# Patient Record
Sex: MALE | Race: WHITE | NOT HISPANIC OR LATINO | URBAN - METROPOLITAN AREA
[De-identification: names, ages, dates, MRNs, and addresses within clinical notes are randomized per-mention and may not be internally consistent; named-entity substitution may affect disease eponyms.]

---

## 2019-05-03 ENCOUNTER — EMERGENCY (EMERGENCY)
Facility: HOSPITAL | Age: 49
LOS: 1 days | Discharge: HOME | End: 2019-05-03
Attending: EMERGENCY MEDICINE
Payer: COMMERCIAL

## 2019-05-03 VITALS
HEART RATE: 77 BPM | DIASTOLIC BLOOD PRESSURE: 80 MMHG | TEMPERATURE: 99 F | OXYGEN SATURATION: 98 % | RESPIRATION RATE: 18 BRPM | SYSTOLIC BLOOD PRESSURE: 131 MMHG

## 2019-05-03 VITALS
WEIGHT: 199.96 LBS | HEART RATE: 81 BPM | RESPIRATION RATE: 18 BRPM | DIASTOLIC BLOOD PRESSURE: 178 MMHG | TEMPERATURE: 98 F | HEIGHT: 69 IN | OXYGEN SATURATION: 100 % | SYSTOLIC BLOOD PRESSURE: 144 MMHG

## 2019-05-03 LAB
ALBUMIN SERPL ELPH-MCNC: 4.2 G/DL — SIGNIFICANT CHANGE UP (ref 3.3–5)
ALP SERPL-CCNC: 38 U/L — LOW (ref 40–120)
ALT FLD-CCNC: 15 U/L — SIGNIFICANT CHANGE UP (ref 10–45)
ANION GAP SERPL CALC-SCNC: 10 MMOL/L — SIGNIFICANT CHANGE UP (ref 5–17)
ANISOCYTOSIS BLD QL: SIGNIFICANT CHANGE UP
APTT BLD: 29.4 SEC — SIGNIFICANT CHANGE UP (ref 27.5–36.3)
AST SERPL-CCNC: 22 U/L — SIGNIFICANT CHANGE UP (ref 10–40)
BASOPHILS # BLD AUTO: 0 K/UL — SIGNIFICANT CHANGE UP (ref 0–0.2)
BASOPHILS NFR BLD AUTO: 1 % — SIGNIFICANT CHANGE UP (ref 0–2)
BILIRUB SERPL-MCNC: 0.2 MG/DL — SIGNIFICANT CHANGE UP (ref 0.2–1.2)
BLD GP AB SCN SERPL QL: NEGATIVE — SIGNIFICANT CHANGE UP
BUN SERPL-MCNC: 20 MG/DL — SIGNIFICANT CHANGE UP (ref 7–23)
CALCIUM SERPL-MCNC: 9.2 MG/DL — SIGNIFICANT CHANGE UP (ref 8.4–10.5)
CHLORIDE SERPL-SCNC: 106 MMOL/L — SIGNIFICANT CHANGE UP (ref 96–108)
CO2 SERPL-SCNC: 22 MMOL/L — SIGNIFICANT CHANGE UP (ref 22–31)
CREAT SERPL-MCNC: 0.92 MG/DL — SIGNIFICANT CHANGE UP (ref 0.5–1.3)
ELLIPTOCYTES BLD QL SMEAR: SIGNIFICANT CHANGE UP
EOSINOPHIL # BLD AUTO: 0.2 K/UL — SIGNIFICANT CHANGE UP (ref 0–0.5)
EOSINOPHIL NFR BLD AUTO: 3 % — SIGNIFICANT CHANGE UP (ref 0–6)
FERRITIN SERPL-MCNC: 6 NG/ML — LOW (ref 30–400)
GLUCOSE SERPL-MCNC: 92 MG/DL — SIGNIFICANT CHANGE UP (ref 70–99)
HAPTOGLOB SERPL-MCNC: 178 MG/DL — SIGNIFICANT CHANGE UP (ref 34–200)
HCT VFR BLD CALC: 19.8 % — CRITICAL LOW (ref 39–50)
HCT VFR BLD CALC: 23.9 % — LOW (ref 39–50)
HGB BLD-MCNC: 5.9 G/DL — CRITICAL LOW (ref 13–17)
HGB BLD-MCNC: 7.2 G/DL — LOW (ref 13–17)
HYPERCHROMIA BLD QL AUTO: SIGNIFICANT CHANGE UP
INR BLD: 1.07 RATIO — SIGNIFICANT CHANGE UP (ref 0.88–1.16)
IRON SATN MFR SERPL: 3 % — LOW (ref 16–55)
IRON SATN MFR SERPL: 9 UG/DL — LOW (ref 45–165)
LYMPHOCYTES # BLD AUTO: 0.9 K/UL — LOW (ref 1–3.3)
LYMPHOCYTES # BLD AUTO: 16 % — SIGNIFICANT CHANGE UP (ref 13–44)
MCHC RBC-ENTMCNC: 17.3 PG — LOW (ref 27–34)
MCHC RBC-ENTMCNC: 19.2 PG — LOW (ref 27–34)
MCHC RBC-ENTMCNC: 29.8 GM/DL — LOW (ref 32–36)
MCHC RBC-ENTMCNC: 30.4 GM/DL — LOW (ref 32–36)
MCV RBC AUTO: 58.1 FL — LOW (ref 80–100)
MCV RBC AUTO: 63.2 FL — LOW (ref 80–100)
MICROCYTES BLD QL: SIGNIFICANT CHANGE UP
MONOCYTES # BLD AUTO: 0.4 K/UL — SIGNIFICANT CHANGE UP (ref 0–0.9)
MONOCYTES NFR BLD AUTO: 13 % — SIGNIFICANT CHANGE UP (ref 2–14)
NEUTROPHILS # BLD AUTO: 2.8 K/UL — SIGNIFICANT CHANGE UP (ref 1.8–7.4)
NEUTROPHILS NFR BLD AUTO: 67 % — SIGNIFICANT CHANGE UP (ref 43–77)
OB PNL STL: NEGATIVE — SIGNIFICANT CHANGE UP
PLAT MORPH BLD: NORMAL — SIGNIFICANT CHANGE UP
PLATELET # BLD AUTO: 251 K/UL — SIGNIFICANT CHANGE UP (ref 150–400)
PLATELET # BLD AUTO: 275 K/UL — SIGNIFICANT CHANGE UP (ref 150–400)
POIKILOCYTOSIS BLD QL AUTO: SLIGHT — SIGNIFICANT CHANGE UP
POLYCHROMASIA BLD QL SMEAR: SLIGHT — SIGNIFICANT CHANGE UP
POTASSIUM SERPL-MCNC: 4.5 MMOL/L — SIGNIFICANT CHANGE UP (ref 3.5–5.3)
POTASSIUM SERPL-SCNC: 4.5 MMOL/L — SIGNIFICANT CHANGE UP (ref 3.5–5.3)
PROT SERPL-MCNC: 6.6 G/DL — SIGNIFICANT CHANGE UP (ref 6–8.3)
PROTHROM AB SERPL-ACNC: 12.2 SEC — SIGNIFICANT CHANGE UP (ref 10–12.9)
RBC # BLD: 3.4 M/UL — LOW (ref 4.2–5.8)
RBC # BLD: 3.78 M/UL — LOW (ref 4.2–5.8)
RBC # FLD: 17.8 % — HIGH (ref 10.3–14.5)
RBC # FLD: 23.3 % — HIGH (ref 10.3–14.5)
RBC BLD AUTO: ABNORMAL
RH IG SCN BLD-IMP: POSITIVE — SIGNIFICANT CHANGE UP
RH IG SCN BLD-IMP: POSITIVE — SIGNIFICANT CHANGE UP
SCHISTOCYTES BLD QL AUTO: SLIGHT — SIGNIFICANT CHANGE UP
SODIUM SERPL-SCNC: 138 MMOL/L — SIGNIFICANT CHANGE UP (ref 135–145)
TIBC SERPL-MCNC: 346 UG/DL — SIGNIFICANT CHANGE UP (ref 220–430)
UIBC SERPL-MCNC: 337 UG/DL — SIGNIFICANT CHANGE UP (ref 110–370)
WBC # BLD: 4.4 K/UL — SIGNIFICANT CHANGE UP (ref 3.8–10.5)
WBC # BLD: 6.9 K/UL — SIGNIFICANT CHANGE UP (ref 3.8–10.5)
WBC # FLD AUTO: 4.4 K/UL — SIGNIFICANT CHANGE UP (ref 3.8–10.5)
WBC # FLD AUTO: 6.9 K/UL — SIGNIFICANT CHANGE UP (ref 3.8–10.5)

## 2019-05-03 PROCEDURE — 86901 BLOOD TYPING SEROLOGIC RH(D): CPT

## 2019-05-03 PROCEDURE — 83550 IRON BINDING TEST: CPT

## 2019-05-03 PROCEDURE — 85045 AUTOMATED RETICULOCYTE COUNT: CPT

## 2019-05-03 PROCEDURE — 85610 PROTHROMBIN TIME: CPT

## 2019-05-03 PROCEDURE — P9016: CPT

## 2019-05-03 PROCEDURE — 82728 ASSAY OF FERRITIN: CPT

## 2019-05-03 PROCEDURE — 93005 ELECTROCARDIOGRAM TRACING: CPT

## 2019-05-03 PROCEDURE — 96374 THER/PROPH/DIAG INJ IV PUSH: CPT

## 2019-05-03 PROCEDURE — 85730 THROMBOPLASTIN TIME PARTIAL: CPT

## 2019-05-03 PROCEDURE — 96361 HYDRATE IV INFUSION ADD-ON: CPT

## 2019-05-03 PROCEDURE — 99285 EMERGENCY DEPT VISIT HI MDM: CPT | Mod: 25

## 2019-05-03 PROCEDURE — 99236 HOSP IP/OBS SAME DATE HI 85: CPT

## 2019-05-03 PROCEDURE — 36430 TRANSFUSION BLD/BLD COMPNT: CPT

## 2019-05-03 PROCEDURE — 83010 ASSAY OF HAPTOGLOBIN QUANT: CPT

## 2019-05-03 PROCEDURE — 93010 ELECTROCARDIOGRAM REPORT: CPT

## 2019-05-03 PROCEDURE — 82272 OCCULT BLD FECES 1-3 TESTS: CPT

## 2019-05-03 PROCEDURE — 86850 RBC ANTIBODY SCREEN: CPT

## 2019-05-03 PROCEDURE — 85027 COMPLETE CBC AUTOMATED: CPT

## 2019-05-03 PROCEDURE — 83540 ASSAY OF IRON: CPT

## 2019-05-03 PROCEDURE — G0378: CPT

## 2019-05-03 PROCEDURE — 80053 COMPREHEN METABOLIC PANEL: CPT

## 2019-05-03 PROCEDURE — 86900 BLOOD TYPING SEROLOGIC ABO: CPT

## 2019-05-03 PROCEDURE — 86923 COMPATIBILITY TEST ELECTRIC: CPT

## 2019-05-03 RX ORDER — PANTOPRAZOLE SODIUM 20 MG/1
40 TABLET, DELAYED RELEASE ORAL
Qty: 0 | Refills: 0 | Status: DISCONTINUED | OUTPATIENT
Start: 2019-05-03 | End: 2019-05-07

## 2019-05-03 RX ORDER — ESOMEPRAZOLE MAGNESIUM 40 MG/1
1 CAPSULE, DELAYED RELEASE ORAL
Qty: 0 | Refills: 0 | COMMUNITY

## 2019-05-03 RX ORDER — SODIUM CHLORIDE 9 MG/ML
1000 INJECTION INTRAMUSCULAR; INTRAVENOUS; SUBCUTANEOUS ONCE
Qty: 0 | Refills: 0 | Status: COMPLETED | OUTPATIENT
Start: 2019-05-03 | End: 2019-05-03

## 2019-05-03 RX ORDER — SERTRALINE 25 MG/1
150 TABLET, FILM COATED ORAL DAILY
Qty: 0 | Refills: 0 | Status: DISCONTINUED | OUTPATIENT
Start: 2019-05-03 | End: 2019-05-07

## 2019-05-03 RX ORDER — IRON SUCROSE 20 MG/ML
200 INJECTION, SOLUTION INTRAVENOUS ONCE
Qty: 0 | Refills: 0 | Status: COMPLETED | OUTPATIENT
Start: 2019-05-03 | End: 2019-05-03

## 2019-05-03 RX ORDER — SERTRALINE 25 MG/1
150 TABLET, FILM COATED ORAL
Qty: 0 | Refills: 0 | COMMUNITY

## 2019-05-03 RX ADMIN — PANTOPRAZOLE SODIUM 40 MILLIGRAM(S): 20 TABLET, DELAYED RELEASE ORAL at 15:52

## 2019-05-03 RX ADMIN — IRON SUCROSE 110 MILLIGRAM(S): 20 INJECTION, SOLUTION INTRAVENOUS at 16:55

## 2019-05-03 RX ADMIN — SODIUM CHLORIDE 1000 MILLILITER(S): 9 INJECTION INTRAMUSCULAR; INTRAVENOUS; SUBCUTANEOUS at 10:06

## 2019-05-03 RX ADMIN — SERTRALINE 150 MILLIGRAM(S): 25 TABLET, FILM COATED ORAL at 15:52

## 2019-05-03 RX ADMIN — SODIUM CHLORIDE 1000 MILLILITER(S): 9 INJECTION INTRAMUSCULAR; INTRAVENOUS; SUBCUTANEOUS at 11:51

## 2019-05-03 NOTE — ED ADULT NURSE NOTE - NSIMPLEMENTINTERV_GEN_ALL_ED
Implemented All Universal Safety Interventions:  [place] to call system. Call bell, personal items and telephone within reach. Instruct patient to call for assistance. Room bathroom lighting operational. Non-slip footwear when patient is off stretcher. Physically safe environment: no spills, clutter or unnecessary equipment. Stretcher in lowest position, wheels locked, appropriate side rails in place.

## 2019-05-03 NOTE — ED CDU PROVIDER INITIAL DAY NOTE - DETAILS
PRBC, determine iron deficiency vs other causes for microcytic anemia PRBC, determine iron deficiency vs other causes for microcytic anemia, frequent re-evaluations, hematology consult.

## 2019-05-03 NOTE — ED PROVIDER NOTE - GASTROINTESTINAL, MLM
Abdomen soft, non-tender, no guarding. BSx 4. Rectal exam guaiac negative no hemorrhoids or fissures noted

## 2019-05-03 NOTE — ED CDU PROVIDER INITIAL DAY NOTE - PROGRESS NOTE DETAILS
Pt comfortable. No complaints. Vital signs stable. Pt seen by hematology Dr. Jimenez earlier, iron infusion ordered.  Spoke with covering hematologist Dr. Rodriguez, states pt can follow up as an outpt for GI eval and hemoglobin electrophoresis, if appropriate H/H response post transfusion. -Nuria Galo PA-C CDU NOTE NUZHAT GARCIA: Pt resting comfortably, feeling well without complaint. denies SOB, CP, fatigue currently. tolerated transfusions well, no ADRs. NAD, VSS. will f/u post transfusion CBC. CDU NOTE NUZHAT GARCIA: repeat H&H increased to 7.2 & 23.9. case and plan discussed with Dr. Wilkerson, will contact heme. CDU NOTE NUZHAT GARCIA: repeat H&H increased to 7.2 & 23.9. pt states he did walk and did not have SOB, CP, or fatigue. case and plan discussed with Dr. Wilkerson, will [facility]. CDU NOTE NUZHAT GARCIA: spoke with Heme Dr. Sherman, stated that the prior plan by her heme colleagues for 2U PRBCs and f/u outpt is appropriate and she agrees with the plan. states she feels the Hgb increase from 5.9 to 7.2 is appropriate and pt stable for outpt f/u given symptoms resolved. case and plan discussed with Dr. Wilkerson; pt stable for discharge as per heme. Attending MD Wilkerson: 49M sent to the CDU after presenting to the ED for low blood count.  In ED, Hb 5.9, transfused 2U pRBCs, 7.2 discussed with heme.  Can follow up outpatient.  Denies chest pain, shortness of breath, palpitations. Denies abdominal pain, nausea, vomiting, diarrhea, blood in stools. Denies dysuria, hematuria.  On exam, NAD, ambulated while speaking with patient no dyspnea while ambulating and speaking, head NCAT, PERRL, FROM at neck, no tenderness to midline palpation, no stepoffs along length of spine, lungs CTAB with good inspiratory effort, +S1S2, no m/r/g, abdomen soft with +BS, NT, ND, no CVAT, moving all extremities with 5/5 strength bilateral upper and lower extremities, good and equal  strength bilaterally; A/P: 49M with anemia, to follow up with heme/onc, IV Fe as per heme/onc

## 2019-05-03 NOTE — CONSULT NOTE ADULT - SUBJECTIVE AND OBJECTIVE BOX
Patient is a 49y old  Male who presents with a chief complaint of shortness of breath and fatigue now for the last few weeks and maybe months. He does not have a history of blood per rectum and eats well. He might have a family history of thalassemia. The pt presented to his primary and cardiologist and bloods returned with a white cell count of 4.4 rbc 3.40 hemoglobin 5.9 hematocrit of 19.8 mcv  58.1 MCHC 29.8 rdw pf 17.8 and platelet count pf [id number] and differential count was not remarkable. The retic count was 1.9 %. He has been on Nexium now for 2 years.    The labs are consistent with iron deficiency and with the relatively high rdw for this hemoglobin it appears he has a hemoglobinopathy as well perhaps beta thal, He is of [place] origin. At this point he is receiving prbcs and I will order iv iron. After iron repletion and likely gi eval, he will have a hemoglobin electrophoresis.    HPI:         MEDICATIONS  (STANDING):  pantoprazole    Tablet 40 milliGRAM(s) Oral before breakfast  sertraline 150 milliGRAM(s) Oral daily    MEDICATIONS  (PRN):      T(F): 97.9 (05-03-19 @ 12:17), Max: 98 (05-03-19 @ 09:21)  HR: 78 (05-03-19 @ 12:17) (62 - 81)  BP: 104/67 (05-03-19 @ 12:17) (98/64 - 144/178)  RR: 17 (05-03-19 @ 12:17) (16 - 18)  SpO2: 100% (05-03-19 @ 12:17) (100% - 100%)    LABS:    CBC Full  -  ( 03 May 2019 10:00 )  WBC Count : 4.4 K/uL  RBC Count : 3.40 M/uL  Hemoglobin : 5.9 g/dL  Hematocrit : 19.8 %  Platelet Count - Automated : 275 K/uL  Mean Cell Volume : 58.1 fl  Mean Cell Hemoglobin : 17.3 pg  Mean Cell Hemoglobin Concentration : 29.8 gm/dL  Auto Neutrophil # : 2.8 K/uL  Auto Lymphocyte # : 0.9 K/uL  Auto Monocyte # : 0.4 K/uL  Auto Eosinophil # : 0.2 K/uL  Auto Basophil # : 0.0 K/uL  Auto Neutrophil % : 67.0 %  Auto Lymphocyte % : 16.0 %  Auto Monocyte % : 13.0 %  Auto Eosinophil % : 3.0 %  Auto Basophil % : 1.0 %    05-03    138  |  106  |  20  ----------------------------<  92  4.5   |  22  |  0.92    Ca    9.2      03 May 2019 10:00    TPro  6.6  /  Alb  4.2  /  TBili  0.2  /  DBili  x   /  AST  22  /  ALT  15  /  AlkPhos  38<L>  05-03    PT/INR - ( 03 May 2019 10:00 )   PT: 12.2 sec;   INR: 1.07 ratio         PTT - ( 03 May 2019 10:00 )  PTT:29.4 sec      ROS:  Negative except for:    PAST MEDICAL & SURGICAL HISTORY:  GERD (gastroesophageal reflux disease)  No significant past surgical history      SOCIAL HISTORY:    FAMILY HISTORY:  Family history of thalassemia (Aunt)      Allergies    Percocet 5/325 (Unknown)    Intolerances        PHYSICAL EXAM pale  General: adult in NAD  HEENT: clear oropharynx, anicteric sclera, pink conjunctivae  Neck: supple  CV: normal S1S2 with no murmur rubs or gallops  Lungs: clear to auscultation, no wheezes, no rales  Abdomen: soft non-tender non-distended, no hepatosplenomegaly  Ext: no clubbing cyanosis or edema  Skin: no rashes and no petechiae  Neuro: alert and oriented X3 no focal deficits    BLOOD SMEAR INTERPRETATION:    RADIOLOGY :

## 2019-05-03 NOTE — CONSULT NOTE ADULT - ASSESSMENT
49y old  Male who presents with a chief complaint of shortness of breath and fatigue now for the last few weeks and maybe months. He does not have a history of blood per rectum and eats well. He might have a family history of thalassemia. The pt presented to his primary and cardiologist and bloods returned with a white cell count of 4.4 rbc 3.40 hemoglobin 5.9 hematocrit of 19.8 mcv  58.1 MCHC 29.8 rdw pf 17.8 and platelet count pf [id number] and differential count was not remarkable. The retic count was 1.9 %. He has been on Nexium now for 2 years.    The labs are consistent with iron deficiency and with the relatively high rdw for this hemoglobin it appears he has a hemoglobinopathy as well perhaps beta thal, He is of [place] origin. At this point he is receiving prbcs and I will order iv iron. After iron repletion and likely gi eval, he will have a hemoglobin electrophoresis.

## 2019-05-03 NOTE — ED ADULT NURSE NOTE - CHPI ED NUR SYMPTOMS NEG
no chills/no nausea/no vomiting/no tingling/no weakness/no decreased eating/drinking/no fever/no pain

## 2019-05-03 NOTE — ED ADULT NURSE NOTE - OBJECTIVE STATEMENT
49 yrs old male with h/o depression and Gerd present to the Er for low h/h. As per pt he went to his PCP and was told that his hemoglobin is 5. Pt report that he has been experiencing generalized tiredness and dizziness for 1 month now. Pt also endorses exertional shortness of breath. Pt also state he also had mood changes and numbness in both hands. Pt communicated that symptoms were aggravated by movements . Pt denies pain. Denies N/V/D. Report normal bowel and bladder movement. Denies having colonoscopy or prostate examination

## 2019-05-03 NOTE — ED PROVIDER NOTE - ATTENDING CONTRIBUTION TO CARE
Dr. Arreguin Note: pt here with wife presenting with malaise for one month, outpt labs showing hgb 5s, no black stool, normal diet, fh of thalasemia, appears pale, non-tachycardic, abdomen soft, nt

## 2019-05-03 NOTE — ED PROVIDER NOTE - CLINICAL SUMMARY MEDICAL DECISION MAKING FREE TEXT BOX
50 yo M cigar smoker with pmhx substance abuse presenting with anemia. PE unremarkable except for palor. Will obtain labs, ekg, and reassess for possible transfusion

## 2019-05-03 NOTE — ED CDU PROVIDER DISPOSITION NOTE - NSFOLLOWUPINSTRUCTIONS_ED_ALL_ED_FT
1. Take over-the-counter Ferrous Sulfate 325mg by mouth twice/day on an empty stomach (with juice/water), 2 hrs prior to or 4hrs after antacids. Eat foods rich in iron such as spinach, lentils, beans, beef, veal, chicken, salmon, and tuna (3oz servings).   2. Drink plenty of fluids to stay hydrated.  3. You will need to follow-up with your PMD and our hematology clinic ([phone]) in 2-3 days for your anemia. A copy of your results were given to you to bring to your appt.  4. Return to ER for lightheaded/dizziness, chest pain, palpitations, shortness of breath, active bleeding, or any other concerns.

## 2019-05-03 NOTE — ED PROVIDER NOTE - OBJECTIVE STATEMENT
48 yo M cigar smoker with pmhx substance abuse presenting with "low blood count". Patient states he has been having fatigue, pale skin and lightheadedness for about a month. Patient admits to sob with exertion with the start of his symptoms. Patient went to his pcp yesterday and had bloodwork done. Patient also received a B12 shot which helped with his symptoms slightly. Patient received a call from his doctor this morning that his HGB was "5". Patient denies cp, cough, fever, ha, neck pain, back pain, abd pain, nvd, melena, brbpr, hematuria, dysuria, and flank pain. Denies history of anemia for himself or transfusions.     fhx- thalassemia in aunt  shx- exETOH abuser and substance abuser. cigar smoker  meds- zoloft

## 2019-05-03 NOTE — ED CDU PROVIDER DISPOSITION NOTE - ATTENDING CONTRIBUTION TO CARE
Attending MD Wilkerson:   I personally have seen and examined this patient.  Physician assistant note reviewed and agree on plan of care and except where noted.  See below for details.     Seen in CDU2, unaccompanied    Attending MD Wilkerson: 49M sent to the CDU after presenting to the ED for low blood count.  In ED, Hb 5.9, transfused 2U pRBCs, 7.2 discussed with heme.  Can follow up outpatient.  Denies chest pain, shortness of breath, palpitations. Denies abdominal pain, nausea, vomiting, diarrhea, blood in stools. Denies dysuria, hematuria.  On exam, NAD, ambulated while speaking with patient no dyspnea while ambulating and speaking, head NCAT, PERRL, FROM at neck, no tenderness to midline palpation, no stepoffs along length of spine, lungs CTAB with good inspiratory effort, +S1S2, no m/r/g, abdomen soft with +BS, NT, ND, no CVAT, moving all extremities with 5/5 strength bilateral upper and lower extremities, good and equal  strength bilaterally; A/P: 49M with anemia, to follow up with heme/onc, IV Fe as per heme/onc

## 2019-05-03 NOTE — ED PROVIDER NOTE - NOTES
Spoke with attending. Agrees with plan. Wants [facility]ematology to see patient [phone]

## 2019-05-03 NOTE — ED CDU PROVIDER DISPOSITION NOTE - CLINICAL COURSE
50 yo M cigar smoker with pmhx substance abuse presenting with "low blood count". Patient states he has been having fatigue, pale skin and lightheadedness for about a month. Patient admits to sob with exertion with the start of his symptoms. Patient went to his pcp yesterday and had bloodwork done. Patient also received a B12 shot which helped with his symptoms slightly. Patient received a call from his doctor this morning that his HGB was "5". Patient denies cp, cough, fever, ha, neck pain, back pain, abd pain, nvd, melena, brbpr, hematuria, dysuria, and flank pain. Denies history of anemia for himself or transfusions.   In ED, H&H 5.9 & 19.8. heme c/s'd, recommended 2U PRBCs and outpt f/u. pt sent to CDU for transfusions.   In CDU, H&H increased to 7.2 & 23.9, symptoms resolved. spoke with heme agree with initial plan for outpt f/u. pt d/c'd and to f/u with heme outpt.

## 2019-05-04 NOTE — ED ADULT NURSE REASSESSMENT NOTE - NS ED NURSE REASSESS COMMENT FT1
Consent in chart. Risks and benefits explained to patient. Patient verbalized understanding of risks and benefits. Patient aware of possible side effects. Vital signs stable. Second RN at bedside for confirmation.
Pt discharged as per provider. Pt verbalizes understanding to discharge orders & will follow up with PMD post discharge. IV lock removed. No bleeding noted. Pt stable upon discharge.
Pt received from [facility]. Pt oriented to CDU & plan of care was discussed. Pt denies any weakness, dizziness/ lightheadedness, SOB or fatigue at the moment. Pt states he is feeling much better. Safety & comfort measures maintained. Call bell in reach. Will continue to monitor.

## 2021-09-07 ENCOUNTER — EMERGENCY (EMERGENCY)
Facility: HOSPITAL | Age: 51
LOS: 1 days | Discharge: HOME | End: 2021-09-07
Attending: EMERGENCY MEDICINE
Payer: COMMERCIAL

## 2021-09-07 VITALS
HEIGHT: 69 IN | DIASTOLIC BLOOD PRESSURE: 73 MMHG | TEMPERATURE: 100 F | RESPIRATION RATE: 18 BRPM | HEART RATE: 97 BPM | SYSTOLIC BLOOD PRESSURE: 114 MMHG | WEIGHT: 190.04 LBS | OXYGEN SATURATION: 98 %

## 2021-09-07 PROCEDURE — 99283 EMERGENCY DEPT VISIT LOW MDM: CPT

## 2021-09-07 PROCEDURE — 73080 X-RAY EXAM OF ELBOW: CPT

## 2021-09-07 PROCEDURE — 99283 EMERGENCY DEPT VISIT LOW MDM: CPT | Mod: 25

## 2021-09-07 PROCEDURE — 73080 X-RAY EXAM OF ELBOW: CPT | Mod: 26,LT

## 2021-09-07 RX ORDER — IBUPROFEN 200 MG
600 TABLET ORAL ONCE
Refills: 0 | Status: COMPLETED | OUTPATIENT
Start: 2021-09-07 | End: 2021-09-07

## 2021-09-07 RX ORDER — COLCHICINE 0.6 MG
1.2 TABLET ORAL ONCE
Refills: 0 | Status: COMPLETED | OUTPATIENT
Start: 2021-09-07 | End: 2021-09-07

## 2021-09-07 RX ADMIN — Medication 600 MILLIGRAM(S): at 14:33

## 2021-09-07 RX ADMIN — Medication 1.2 MILLIGRAM(S): at 14:40

## 2021-09-07 NOTE — ED PROVIDER NOTE - PLAN OF CARE
L elbow pain / olecranon bursitis s/p blunt trauma 1 week ago, low suspicion for septic joint  -XR  -Motrin likely olecranon bursitis

## 2021-09-07 NOTE — ED ADULT NURSE NOTE - NSFALLRSKASSESSDT_ED_ALL_ED
Called and left messageX2.  No contact.     Reason for Disposition  • Second attempt to contact family AND no contact made.  Answering service notified.    Protocols used: NO CONTACT OR DUPLICATE CONTACT CALL-P-AH       07-Sep-2021 14:28

## 2021-09-07 NOTE — ED PROVIDER NOTE - CLINICAL SUMMARY MEDICAL DECISION MAKING FREE TEXT BOX
Dr. Arreguin Note: s/s c/w likely olecranon bursitis L elbow, no signs of septic joint as pt able to ROM elbow without pain and has more pain and inflammation of olecranon bursa area.

## 2021-09-07 NOTE — ED PROVIDER NOTE - OBJECTIVE STATEMENT
52yo M pmh gout, prior alcohol dependence now sober presents for evaluation of L elbow pain/swelling x 1 week s/p striking elbow at work (). Initially improved w allopurinol at home, but then became worse over past 2 days with increased swelling to elbow and proximal forearm. No fever/chills. No ROM change. Typically gets gout in the toe, and usually in the setting of alcohol use. Elbow pain inconsistent with degree of gouty type pain from the past. No open wounds.

## 2021-09-07 NOTE — ED PROVIDER NOTE - PROGRESS NOTE DETAILS
Motrin and single dose colchicine provided (although low suspicion of gout). XR reviewed by Dr Arreguin without acute emergent findings. Plan to discharged w prednisone 50mg x 4 days in place of the Medrol dose pack he just started. Plan for discharge discussed with patient. All questions answered. Pt verbalizes agreement and understanding of plan. -Joanna Pena PA-C

## 2021-09-07 NOTE — ED PROVIDER NOTE - ATTENDING CONTRIBUTION TO CARE
Pt with LEFT elbow pain and reddness for one week, tender overlying olecranon bursa, FROM of elbow, no elbow effusion palpable.

## 2021-09-07 NOTE — ED PROVIDER NOTE - NSFOLLOWUPCLINICS_GEN_ALL_ED_FT
[facility]  Orthopedic Surgery  [address], 3rd & 4th [place] [address]  Phone: [phone]  Fax:

## 2021-09-07 NOTE — ED PROVIDER NOTE - PATIENT PORTAL LINK FT
You can access the FollowMyHealth Patient Portal offered by [facility] by registering at the following website: [email or web address]. By joining [facility]’s FollowMyHealth portal, you will also be able to view your health information using other applications (apps) compatible with our system.

## 2021-09-07 NOTE — ED ADULT NURSE NOTE - OBJECTIVE STATEMENT
52 yo M w/ PMHx of GERD, gout presents to ED via waiting room c/o L elbow pain. Pt reports he hit his elbow at work 1 week ago, was initially improving before worsening again. Unimproved with allopurinol. No change in ROM. Mild swelling to L elbow and forearm noted. Pt denies any CP, SOB, cough, N/V, fever, chills, urinary complaints, constipation, diarrhea, HA, dizziness, weakness. Pt A&Ox4, lungs CTA, +central pulses. Abdomen soft, not tender, not distended. Ambulating w/ steady gait, safety and comfort maintained, no acute distress noted at this time. Pt denies any recent travel or known sick contacts.

## 2021-09-07 NOTE — ED PROVIDER NOTE - MUSCULOSKELETAL, MLM
Spine appears normal, range of motion is not limited, no muscle tenderness. L elbow w olecranon bursitis. Mild overlying erythema and swelling that extends on extensor surface over brachioradialis region. Tenderness limited to medial aspect of olecranon in joint space. FROM. No warmth. Skin intact. Radial pulse 2+. Sensation intact.

## 2021-09-07 NOTE — ED PROVIDER NOTE - NSFOLLOWUPINSTRUCTIONS_ED_ALL_ED_FT
Rest. Hydrate. Elevate affected site. Ace wrap for compression of elbow.  Follow up with Orthopedist for further evaluation. Call for appointment.  For pain Take motrin 600mg by mouth every 8 hours as needed for pain. Take with food. (Available over the counter - take three 200mg tabs).  Discontinue Medrol dose pack, and instead start Prednisone 50mg for 5 days. Next dose should be tomorrow morning.  Return to ER for new or worsening symptoms including severe pain, numbness, tingling, weakness, swelling, or any concern.

## 2021-09-07 NOTE — ED PROVIDER NOTE - CARE PLAN
1 Principal Discharge DX:	Left elbow pain  Assessment and plan of treatment:	L elbow pain / olecranon bursitis s/p blunt trauma 1 week ago, low suspicion for septic joint  -XR  -Motrin   Principal Discharge DX:	Left elbow pain  Goal:	likely olecranon bursitis  Assessment and plan of treatment:	L elbow pain / olecranon bursitis s/p blunt trauma 1 week ago, low suspicion for septic joint  -XR  -Motrin

## 2021-09-07 NOTE — ED ADULT NURSE NOTE - NSICDXFAMILYHX_GEN_ALL_CORE_FT
FAMILY HISTORY:  Aunt  Still living? Unknown  Family history of thalassemia, Age at diagnosis: Age Unknown

## 2022-06-20 NOTE — ED PROVIDER NOTE - TOBACCO/CIGAR DURATION (MO/YR)
Date 6/20/22   Exercise    Supine wand shoulder flex Hold 5-10 s X 3-5 reps   Wall walk flex for shld Hold 30 seconds X 1-3 reps   Standing wand shld IR/EXT Hold 30 seconds X 1-3 reps                                          when golfing

## 2023-12-12 PROBLEM — M10.9 GOUT, UNSPECIFIED: Chronic | Status: ACTIVE | Noted: 2021-09-07

## 2023-12-12 PROBLEM — K21.9 GASTRO-ESOPHAGEAL REFLUX DISEASE WITHOUT ESOPHAGITIS: Chronic | Status: ACTIVE | Noted: 2019-05-03

## 2024-01-19 ENCOUNTER — OUTPATIENT (OUTPATIENT)
Dept: OUTPATIENT SERVICES | Facility: HOSPITAL | Age: 54
LOS: 1 days | Discharge: HOME | End: 2024-01-19

## 2024-01-19 DIAGNOSIS — D64.9 ANEMIA, UNSPECIFIED: ICD-10-CM

## 2024-01-22 ENCOUNTER — APPOINTMENT (OUTPATIENT)
Dept: HEMATOLOGY ONCOLOGY | Facility: CLINIC | Age: 54
End: 2024-01-22

## 2024-02-13 ENCOUNTER — RESULT REVIEW (OUTPATIENT)
Age: 54
End: 2024-02-13

## 2024-02-13 ENCOUNTER — APPOINTMENT (OUTPATIENT)
Dept: HEMATOLOGY ONCOLOGY | Facility: CLINIC | Age: 54
End: 2024-02-13
Payer: COMMERCIAL

## 2024-02-13 ENCOUNTER — NON-APPOINTMENT (OUTPATIENT)
Age: 54
End: 2024-02-13

## 2024-02-13 VITALS
BODY MASS INDEX: 30.07 KG/M2 | HEIGHT: 68.11 IN | RESPIRATION RATE: 16 BRPM | SYSTOLIC BLOOD PRESSURE: 147 MMHG | HEART RATE: 94 BPM | TEMPERATURE: 97.2 F | WEIGHT: 198.41 LBS | OXYGEN SATURATION: 97 % | DIASTOLIC BLOOD PRESSURE: 84 MMHG

## 2024-02-13 DIAGNOSIS — D50.8 OTHER IRON DEFICIENCY ANEMIAS: ICD-10-CM

## 2024-02-13 DIAGNOSIS — F17.290 NICOTINE DEPENDENCE, OTHER TOBACCO PRODUCT, UNCOMPLICATED: ICD-10-CM

## 2024-02-13 LAB
ALBUMIN SERPL ELPH-MCNC: 4.5 G/DL
ALP BLD-CCNC: 67 U/L
ALT SERPL-CCNC: 15 U/L
ANION GAP SERPL CALC-SCNC: 14 MMOL/L
AST SERPL-CCNC: 16 U/L
BASOPHILS # BLD AUTO: 0.06 K/UL — SIGNIFICANT CHANGE UP (ref 0–0.2)
BASOPHILS NFR BLD AUTO: 1.1 % — SIGNIFICANT CHANGE UP (ref 0–2)
BILIRUB SERPL-MCNC: 0.4 MG/DL
BUN SERPL-MCNC: 16 MG/DL
CALCIUM SERPL-MCNC: 9.7 MG/DL
CHLORIDE SERPL-SCNC: 104 MMOL/L
CO2 SERPL-SCNC: 23 MMOL/L
CREAT SERPL-MCNC: 0.97 MG/DL
EGFR: 93 ML/MIN/1.73M2
EOSINOPHIL # BLD AUTO: 0.17 K/UL — SIGNIFICANT CHANGE UP (ref 0–0.5)
EOSINOPHIL NFR BLD AUTO: 3.2 % — SIGNIFICANT CHANGE UP (ref 0–6)
FERRITIN SERPL-MCNC: 16 NG/ML
GLUCOSE SERPL-MCNC: 132 MG/DL
HCT VFR BLD CALC: 43.6 % — SIGNIFICANT CHANGE UP (ref 39–50)
HGB BLD-MCNC: 14.1 G/DL — SIGNIFICANT CHANGE UP (ref 13–17)
IMM GRANULOCYTES NFR BLD AUTO: 0 % — SIGNIFICANT CHANGE UP (ref 0–0.9)
IRON SATN MFR SERPL: 14 %
IRON SERPL-MCNC: 53 UG/DL
LYMPHOCYTES # BLD AUTO: 1.18 K/UL — SIGNIFICANT CHANGE UP (ref 1–3.3)
LYMPHOCYTES # BLD AUTO: 22.1 % — SIGNIFICANT CHANGE UP (ref 13–44)
MCHC RBC-ENTMCNC: 26.7 PG — LOW (ref 27–34)
MCHC RBC-ENTMCNC: 32.3 G/DL — SIGNIFICANT CHANGE UP (ref 32–36)
MCV RBC AUTO: 82.6 FL — SIGNIFICANT CHANGE UP (ref 80–100)
MONOCYTES # BLD AUTO: 0.41 K/UL — SIGNIFICANT CHANGE UP (ref 0–0.9)
MONOCYTES NFR BLD AUTO: 7.7 % — SIGNIFICANT CHANGE UP (ref 2–14)
NEUTROPHILS # BLD AUTO: 3.51 K/UL — SIGNIFICANT CHANGE UP (ref 1.8–7.4)
NEUTROPHILS NFR BLD AUTO: 65.9 % — SIGNIFICANT CHANGE UP (ref 43–77)
NRBC # BLD: 0 /100 WBCS — SIGNIFICANT CHANGE UP (ref 0–0)
PLATELET # BLD AUTO: 270 K/UL — SIGNIFICANT CHANGE UP (ref 150–400)
POTASSIUM SERPL-SCNC: 4.1 MMOL/L
PROT SERPL-MCNC: 7.2 G/DL
RBC # BLD: 5.28 M/UL — SIGNIFICANT CHANGE UP (ref 4.2–5.8)
RBC # FLD: 14.5 % — SIGNIFICANT CHANGE UP (ref 10.3–14.5)
SODIUM SERPL-SCNC: 140 MMOL/L
TIBC SERPL-MCNC: 376 UG/DL
UIBC SERPL-MCNC: 323 UG/DL
WBC # BLD: 5.33 K/UL — SIGNIFICANT CHANGE UP (ref 3.8–10.5)
WBC # FLD AUTO: 5.33 K/UL — SIGNIFICANT CHANGE UP (ref 3.8–10.5)

## 2024-02-13 PROCEDURE — 99406 BEHAV CHNG SMOKING 3-10 MIN: CPT

## 2024-02-13 PROCEDURE — 99215 OFFICE O/P EST HI 40 MIN: CPT

## 2024-02-13 PROCEDURE — G2211 COMPLEX E/M VISIT ADD ON: CPT | Mod: NC,1L

## 2024-02-15 PROBLEM — F17.290 CIGAR SMOKER: Status: ACTIVE | Noted: 2024-02-15

## 2024-02-15 PROBLEM — D50.8 OTHER IRON DEFICIENCY ANEMIA: Status: ACTIVE | Noted: 2024-01-22

## 2024-02-15 RX ORDER — CHLORHEXIDINE GLUCONATE 4 %
325 (65 FE) LIQUID (ML) TOPICAL
Refills: 0 | Status: ACTIVE | COMMUNITY

## 2024-02-15 NOTE — HISTORY OF PRESENT ILLNESS
[de-identified] : 53M with PMH of iron deficiency anemia previously evaluated in 2019 at [facility] presenting in 2/2024 to  [facility] to establish care and follow up monitoring of iron deficiency anemia. Had noted increased episodes of palpitations but reports that he drinks tremendous amount of coffee and knows he needs to cut down.  Hematologic history: -5/2019: experienced progressive fatigue, dyspnea on exertion, found to have microcytic anemia with hgb=5 g/dL - Initially started PO iron TID with good response - 6/5/19: EGD/Colonoscopy with no abnormalities - Tested negative for hemoglobinopathy, blood indices normalized with oral iron replacement - not seen between 10/2020 - 3/2022 then was seen again at [facility] and had normal blood indices with Hg >14 g/dL   [de-identified] : 2/13/24:  Today he reports he feels well. Reports compliance with OTC ferrous sulfate once dailly about 50% of the time. Reports a one year history of infrequent palpitations lasting 10mins that resolves spontaneously, which he attributes to a lot of coffee consumption. Denies Chest pain, shortness of breath, dizziness, syncope, no blood in stool or urine  [100: Normal, no complaints, no evidence of disease.] : 100: Normal, no complaints, no evidence of disease.

## 2024-02-15 NOTE — REVIEW OF SYSTEMS
[Palpitations] : palpitations [Fever] : no fever [Chills] : no chills [Chest Pain] : no chest pain [Shortness Of Breath] : no shortness of breath [Abdominal Pain] : no abdominal pain [Joint Pain] : no joint pain [Skin Rash] : no skin rash [Confused] : no confusion [Easy Bleeding] : no tendency for easy bleeding

## 2024-02-15 NOTE — ADDENDUM
[FreeTextEntry1] : Labs reviewed with patient via telephone in the afternoon. Noted to have drop in Ferritin and iron saturation. Would increase iron to Ferrous gluconate BID and will monitor labs in 6 months. May also need to undergo additional GI evaluation (i.e. capsule endoscopy); additionally discussed need for cardiology evaluation with palpitations and counseled regarding smoking cessation

## 2024-02-15 NOTE — REASON FOR VISIT
[Follow-Up Visit] : a follow-up visit for [Blood Count Assessment] : blood count assessment [FreeTextEntry2] : Iron deficiency

## 2024-02-15 NOTE — ASSESSMENT
[FreeTextEntry1] : 53M with PMH of iron deficiency anemia, now presenting to clinic for follow up monitoring of iron deficiency anemia.  Hematologic history: -5/2019: experienced progressive fatigue, dyspnea on exertion, found to have microcytic anemia with hgb=5 - Initially started PO iron TID with good response - 6/5/19: EGD/Colonoscopy with no abnormalities - Tested negative for hemoglobinopathy, blood indices normalized with oral iron replacement - Noted good response to oral iron supplementation in the past  Plan: - Check CBC w/ diff, iron, TIBC, %sat, ferritin, TSH today - Pending lab results, may adjust PO iron regimen as needed - Advised to reduce coffee intake and monitor for improvement in intermittent palpitations   Case discussed w/ Dr. Woodrow Ralph, PG-4 Hematology/Oncology Fellow

## 2024-08-11 ENCOUNTER — OUTPATIENT (OUTPATIENT)
Dept: OUTPATIENT SERVICES | Facility: HOSPITAL | Age: 54
LOS: 1 days | Discharge: HOME | End: 2024-08-11

## 2024-08-11 DIAGNOSIS — D64.9 ANEMIA, UNSPECIFIED: ICD-10-CM

## 2024-08-13 ENCOUNTER — APPOINTMENT (OUTPATIENT)
Dept: HEMATOLOGY ONCOLOGY | Facility: CLINIC | Age: 54
End: 2024-08-13
